# Patient Record
Sex: FEMALE | Race: WHITE | NOT HISPANIC OR LATINO | URBAN - NONMETROPOLITAN AREA
[De-identification: names, ages, dates, MRNs, and addresses within clinical notes are randomized per-mention and may not be internally consistent; named-entity substitution may affect disease eponyms.]

---

## 2020-02-26 ENCOUNTER — OFFICE (OUTPATIENT)
Dept: URBAN - NONMETROPOLITAN AREA CLINIC 14 | Facility: CLINIC | Age: 35
End: 2020-02-26

## 2020-02-26 VITALS
DIASTOLIC BLOOD PRESSURE: 86 MMHG | SYSTOLIC BLOOD PRESSURE: 122 MMHG | WEIGHT: 169 LBS | HEIGHT: 67 IN | HEART RATE: 83 BPM | RESPIRATION RATE: 17 BRPM

## 2020-02-26 DIAGNOSIS — R10.13 EPIGASTRIC PAIN: ICD-10-CM

## 2020-02-26 DIAGNOSIS — R13.10 DYSPHAGIA, UNSPECIFIED: ICD-10-CM

## 2020-02-26 DIAGNOSIS — R11.2 NAUSEA WITH VOMITING, UNSPECIFIED: ICD-10-CM

## 2020-02-26 DIAGNOSIS — K21.9 GASTRO-ESOPHAGEAL REFLUX DISEASE WITHOUT ESOPHAGITIS: ICD-10-CM

## 2020-02-26 DIAGNOSIS — R19.5 OTHER FECAL ABNORMALITIES: ICD-10-CM

## 2020-02-26 PROCEDURE — 99204 OFFICE O/P NEW MOD 45 MIN: CPT

## 2020-02-26 NOTE — SERVICEHPINOTES
Lu Samuels   is a   34   year old  female   who is here today for c/o extreme HB, reflux, nausea, and occasional vomiting. No hematemesis. She has had heartburn for more than 10 years and was told by MEAGAN Mackey to be on protonix daily for life "years ago". She took it for about a year and has been off of it for quite some time. Postprandial epigastric pain. Dysphagia with all foods. No difficulty with liquids. Loose stool, up to 8 per day. Denies BRBPR and mucous in her stool. No melena. No fever, chills, or sick contacts. She is a nurse at [employer]. Kelli Amos NP where she works told to try a GI cocktail, but called magic mouthwash, it did not help at all. She tried a levsin tablet this am from a friend at work without any relief.    No weight loss.

## 2020-02-26 NOTE — SERVICENOTES
Concerned for PUD, stricture, BE, H. Pylori, other. Further recs by STT after EGD. I will see her anytime. 
Given Dexilant 60 mg po in office in the form of a sample as well as Carafate 1 gram/10 mL. Tolerated well. Moderate relief of symptoms.

## 2020-02-27 RX ORDER — SUCRALFATE 1 G/1
TABLET ORAL
Qty: 90 | Refills: 0 | Status: ACTIVE
Start: 2020-02-27

## 2020-02-27 RX ORDER — DEXLANSOPRAZOLE 60 MG/1
60 CAPSULE, DELAYED RELEASE ORAL
Qty: 90 | Refills: 3 | Status: ACTIVE
Start: 2020-02-27

## 2020-03-03 ENCOUNTER — ON CAMPUS - OUTPATIENT (OUTPATIENT)
Dept: URBAN - NONMETROPOLITAN AREA HOSPITAL 24 | Facility: HOSPITAL | Age: 35
End: 2020-03-03

## 2020-03-03 DIAGNOSIS — K22.2 ESOPHAGEAL OBSTRUCTION: ICD-10-CM

## 2020-03-03 DIAGNOSIS — K29.70 GASTRITIS, UNSPECIFIED, WITHOUT BLEEDING: ICD-10-CM

## 2020-03-03 DIAGNOSIS — R13.10 DYSPHAGIA, UNSPECIFIED: ICD-10-CM

## 2020-03-03 DIAGNOSIS — K20.9 ESOPHAGITIS, UNSPECIFIED: ICD-10-CM

## 2020-03-03 DIAGNOSIS — K44.9 DIAPHRAGMATIC HERNIA WITHOUT OBSTRUCTION OR GANGRENE: ICD-10-CM

## 2020-03-03 DIAGNOSIS — R11.2 NAUSEA WITH VOMITING, UNSPECIFIED: ICD-10-CM

## 2020-03-03 PROCEDURE — 43249 ESOPH EGD DILATION <30 MM: CPT | Performed by: INTERNAL MEDICINE

## 2020-04-07 ENCOUNTER — TELEHEALTH PROVIDED OTHER THAN IN PATIENT'S HOME (OUTPATIENT)
Dept: URBAN - NONMETROPOLITAN AREA CLINIC 16 | Facility: CLINIC | Age: 35
End: 2020-04-07

## 2020-04-07 VITALS — HEIGHT: 67 IN

## 2020-04-07 DIAGNOSIS — R10.11 RIGHT UPPER QUADRANT PAIN: ICD-10-CM

## 2020-04-07 DIAGNOSIS — K21.9 GASTRO-ESOPHAGEAL REFLUX DISEASE WITHOUT ESOPHAGITIS: ICD-10-CM

## 2020-04-07 PROCEDURE — 99213 OFFICE O/P EST LOW 20 MIN: CPT

## 2020-04-07 NOTE — SERVICEHPINOTES
This pt is being seen today in f/u via [facility] per her request during this covid-19 quarantine. She c/o RUQ pain and nausea, worse after eating. Compliant with Dexilant and Pepcid daily. Good appetite, but afraid to eat. Recent EGD and abdominal US reviewed. No heartburn or reflux. Describes moderate RUQ requiring her to take a friend's pain medication yesterday and go to bed. Denies yellowing of eyes/skin. No rashes or itching. spent 14 minutes with this patient.

## 2020-05-07 ENCOUNTER — TELEHEALTH PROVIDED OTHER THAN IN PATIENT'S HOME (OUTPATIENT)
Dept: URBAN - NONMETROPOLITAN AREA CLINIC 14 | Facility: CLINIC | Age: 35
End: 2020-05-07

## 2020-05-07 VITALS — HEIGHT: 67 IN

## 2020-05-07 DIAGNOSIS — K21.9 GASTRO-ESOPHAGEAL REFLUX DISEASE WITHOUT ESOPHAGITIS: ICD-10-CM

## 2020-05-07 DIAGNOSIS — R10.84 GENERALIZED ABDOMINAL PAIN: ICD-10-CM

## 2020-05-07 PROCEDURE — 99213 OFFICE O/P EST LOW 20 MIN: CPT | Mod: 95

## 2020-05-07 RX ORDER — AMITRIPTYLINE HYDROCHLORIDE 25 MG/1
25 TABLET, FILM COATED ORAL
Qty: 90 | Refills: 1 | Status: ACTIVE
Start: 2020-04-09